# Patient Record
Sex: MALE | Race: OTHER | NOT HISPANIC OR LATINO
[De-identification: names, ages, dates, MRNs, and addresses within clinical notes are randomized per-mention and may not be internally consistent; named-entity substitution may affect disease eponyms.]

---

## 2018-02-05 PROBLEM — Z00.00 ENCOUNTER FOR PREVENTIVE HEALTH EXAMINATION: Status: ACTIVE | Noted: 2018-02-05

## 2018-02-09 ENCOUNTER — APPOINTMENT (OUTPATIENT)
Dept: OPHTHALMOLOGY | Facility: CLINIC | Age: 58
End: 2018-02-09
Payer: COMMERCIAL

## 2018-02-09 PROCEDURE — 92014 COMPRE OPH EXAM EST PT 1/>: CPT

## 2018-02-09 PROCEDURE — 92015 DETERMINE REFRACTIVE STATE: CPT

## 2022-05-25 ENCOUNTER — APPOINTMENT (OUTPATIENT)
Dept: UROLOGY | Facility: CLINIC | Age: 62
End: 2022-05-25
Payer: COMMERCIAL

## 2022-05-25 VITALS
HEIGHT: 69 IN | WEIGHT: 170 LBS | OXYGEN SATURATION: 98 % | BODY MASS INDEX: 25.18 KG/M2 | SYSTOLIC BLOOD PRESSURE: 122 MMHG | TEMPERATURE: 98.1 F | HEART RATE: 57 BPM | DIASTOLIC BLOOD PRESSURE: 78 MMHG

## 2022-05-25 DIAGNOSIS — Z80.0 FAMILY HISTORY OF MALIGNANT NEOPLASM OF DIGESTIVE ORGANS: ICD-10-CM

## 2022-05-25 DIAGNOSIS — N50.3 CYST OF EPIDIDYMIS: ICD-10-CM

## 2022-05-25 DIAGNOSIS — E78.00 PURE HYPERCHOLESTEROLEMIA, UNSPECIFIED: ICD-10-CM

## 2022-05-25 DIAGNOSIS — Z78.9 OTHER SPECIFIED HEALTH STATUS: ICD-10-CM

## 2022-05-25 DIAGNOSIS — Z80.42 FAMILY HISTORY OF MALIGNANT NEOPLASM OF PROSTATE: ICD-10-CM

## 2022-05-25 PROCEDURE — 99205 OFFICE O/P NEW HI 60 MIN: CPT

## 2022-05-25 RX ORDER — OMEPRAZOLE MAGNESIUM 20 MG/1
20 CAPSULE, DELAYED RELEASE ORAL
Refills: 0 | Status: ACTIVE | COMMUNITY

## 2022-05-25 RX ORDER — FAMOTIDINE 10 MG/1
TABLET, FILM COATED ORAL
Refills: 0 | Status: ACTIVE | COMMUNITY

## 2022-05-25 RX ORDER — BUDESONIDE AND FORMOTEROL FUMARATE DIHYDRATE 160; 4.5 UG/1; UG/1
AEROSOL RESPIRATORY (INHALATION)
Refills: 0 | Status: ACTIVE | COMMUNITY

## 2022-05-25 RX ORDER — ATORVASTATIN CALCIUM 80 MG/1
TABLET, FILM COATED ORAL
Refills: 0 | Status: ACTIVE | COMMUNITY

## 2022-05-25 NOTE — LETTER BODY
[Dear  ___] : Dear  [unfilled], [Consult Letter:] : I had the pleasure of evaluating your patient, [unfilled]. [Please see my note below.] : Please see my note below. [Consult Closing:] : Thank you very much for allowing me to participate in the care of this patient.  If you have any questions, please do not hesitate to contact me. [Sincerely,] : Sincerely, [FreeTextEntry3] : Lazaro Myers MD\par

## 2022-05-25 NOTE — PHYSICAL EXAM
[General Appearance - Well Developed] : well developed [General Appearance - Well Nourished] : well nourished [Normal Appearance] : normal appearance [Well Groomed] : well groomed [General Appearance - In No Acute Distress] : no acute distress [Edema] : no peripheral edema [Respiration, Rhythm And Depth] : normal respiratory rhythm and effort [Exaggerated Use Of Accessory Muscles For Inspiration] : no accessory muscle use [Abdomen Soft] : soft [Abdomen Tenderness] : non-tender [Costovertebral Angle Tenderness] : no ~M costovertebral angle tenderness [Urethral Meatus] : meatus normal [Urinary Bladder Findings] : the bladder was normal on palpation [Scrotum] : the scrotum was normal [Testes Mass (___cm)] : there were no testicular masses [No Prostate Nodules] : no prostate nodules [Normal Station and Gait] : the gait and station were normal for the patient's age [] : no rash [No Focal Deficits] : no focal deficits [Oriented To Time, Place, And Person] : oriented to person, place, and time [Affect] : the affect was normal [Mood] : the mood was normal [Not Anxious] : not anxious [No Palpable Adenopathy] : no palpable adenopathy [Epididymis] : the epididymides were normal [Testes Tenderness] : no tenderness of the testes [FreeTextEntry1] : TARA: deferred. small bilateral epididymal cysts. NO intervention needed

## 2022-05-25 NOTE — REVIEW OF SYSTEMS
[Cough] : cough [Wheezing] : wheezing [Heartburn] : heartburn [Poor quality erections] : Poor quality erections [Seen by urologist before (Name)  ___] : Preciously seen by a urologist: [unfilled] [Negative] : Heme/Lymph

## 2022-05-25 NOTE — HISTORY OF PRESENT ILLNESS
[FreeTextEntry1] : 05/25/2022: Consultation for Ca prostate recently diagnosed. \par \par PSA  from 2 yrs ago :  4.6 ng/ ml \par 01/22/2022: 7.69 ng/ ml \par \par MRI:   2/18/22 : PIRADS 4 lesion right lobe of prostate. \par \par Fusion biopsy : 5/5/22 [facility], Ca prostate. Report attached.  \par \par Pt has an appointment with Oncologist. \par \par TARA deferred for recent biopsy. \par \par All different options explained and discussed. \par \par pt. will repeat PSA in one month and reevaluate in 6 weeks with TARA and repeat PSA.\par \par

## 2022-07-07 ENCOUNTER — APPOINTMENT (OUTPATIENT)
Dept: UROLOGY | Facility: CLINIC | Age: 62
End: 2022-07-07

## 2022-07-07 VITALS
HEIGHT: 69 IN | WEIGHT: 172 LBS | RESPIRATION RATE: 16 BRPM | TEMPERATURE: 97.8 F | SYSTOLIC BLOOD PRESSURE: 127 MMHG | HEART RATE: 72 BPM | DIASTOLIC BLOOD PRESSURE: 81 MMHG | BODY MASS INDEX: 25.48 KG/M2

## 2022-07-07 DIAGNOSIS — C61 MALIGNANT NEOPLASM OF PROSTATE: ICD-10-CM

## 2022-07-07 PROCEDURE — 99215 OFFICE O/P EST HI 40 MIN: CPT

## 2022-07-07 NOTE — END OF VISIT
Called and left a message for the patient to call us back to schedule an appointment for sclerotherapy with Dr. Mercedes in [place].     Received notification that it has been approved from [facility].     Per Dr. Mercedes can be scheduled on 2/7/19 or after in [place]. Appointment should be 30 minutes in length.    [Time Spent: ___ minutes] : I have spent [unfilled] minutes of time on the encounter.

## 2022-07-07 NOTE — HISTORY OF PRESENT ILLNESS
[FreeTextEntry1] : Here for visit to discuss prostate cancer diagnosis.\par Elevated PSA, abnormal prostate MRI.\par Fusion biopsy showed David 7 (3+4) prostate cancer.\par No urinary complaints. \par Has pre-existing erectile dysfunction but not bothersome at the current time.\par +Family hx of prostate cancer: Brother, Father.\par

## 2022-07-07 NOTE — DISEASE MANAGEMENT
[1] : T1 [c] : c [0] : M0 [0-10] : 0 -10 ng/mL [Biopsy with Fusion] : Patient had a biopsy with fusion on [7(3+4)] : Fusion Biopsy [place] Score: 7(3+4) [Biopsy results sent to PCP/Referring Physician] : Biopsy results sent to PCP/Referring Physician [IIB] : IIB [] : Patient had no Bone Scan performed [BiopsyDate] : 5/9/2022 [TotalCores] : 13 [TotalPositiveCores] : 4 [MaxCoreInvolvement] : 40% [FreeTextEntry7] : MRI prostate ([facility]): 33 mL volume, PIRAD 4, Right PZpl. No OMAIRA, SV.

## 2022-07-07 NOTE — DISEASE MANAGEMENT
[1] : T1 [c] : c [0] : M0 [0-10] : 0 -10 ng/mL [Biopsy with Fusion] : Patient had a biopsy with fusion on [7(3+4)] : Fusion Biopsy [place] Score: 7(3+4) [Biopsy results sent to PCP/Referring Physician] : Biopsy results sent to PCP/Referring Physician [IIB] : IIB [] : Patient had no Bone Scan performed [BiopsyDate] : 5/9/2022 [TotalCores] : 13 [TotalPositiveCores] : 4 [MaxCoreInvolvement] : 40% [FreeTextEntry7] : MRI prostate ([facility]): 33 mL volume, PIRAD 4, Right PZpl. No OMAIRA, SV.

## 2022-07-07 NOTE — LETTER CLOSING
[Consult Closing:] : Thank you for allowing me to participate in the care of this patient.  If you have any questions, please do not hesitate to contact me. [Sincerely yours,] : Sincerely yours, [FreeTextEntry3] : Valdo Velazquez MD\par System Chief of Urology, [facility]\par  of Urology\par [employer] [facility]\par [facility] \par [address] [place] [address], [address] \[place] [address]

## 2022-07-07 NOTE — LETTER CLOSING
[Consult Closing:] : Thank you for allowing me to participate in the care of this patient.  If you have any questions, please do not hesitate to contact me. [Sincerely yours,] : Sincerely yours, [FreeTextEntry3] : Valdo Velazquez MD\par System Chief of Urology, [facility]\par  of Urology\par [facility]\par [facility] \par [address] [place] [address], [address] \[place] [address]

## 2022-07-15 ENCOUNTER — RESULT REVIEW (OUTPATIENT)
Age: 62
End: 2022-07-15

## 2022-08-02 ENCOUNTER — APPOINTMENT (OUTPATIENT)
Dept: UROLOGY | Facility: CLINIC | Age: 62
End: 2022-08-02

## 2022-08-03 ENCOUNTER — OUTPATIENT (OUTPATIENT)
Dept: OUTPATIENT SERVICES | Facility: HOSPITAL | Age: 62
LOS: 1 days | End: 2022-08-03

## 2022-08-03 VITALS
HEIGHT: 67.5 IN | HEART RATE: 58 BPM | WEIGHT: 175.05 LBS | RESPIRATION RATE: 16 BRPM | TEMPERATURE: 98 F | OXYGEN SATURATION: 97 % | DIASTOLIC BLOOD PRESSURE: 80 MMHG | SYSTOLIC BLOOD PRESSURE: 110 MMHG

## 2022-08-03 DIAGNOSIS — C61 MALIGNANT NEOPLASM OF PROSTATE: ICD-10-CM

## 2022-08-03 DIAGNOSIS — Z98.890 OTHER SPECIFIED POSTPROCEDURAL STATES: Chronic | ICD-10-CM

## 2022-08-03 DIAGNOSIS — J45.909 UNSPECIFIED ASTHMA, UNCOMPLICATED: ICD-10-CM

## 2022-08-03 LAB
BLD GP AB SCN SERPL QL: NEGATIVE — SIGNIFICANT CHANGE UP
RH IG SCN BLD-IMP: POSITIVE — SIGNIFICANT CHANGE UP

## 2022-08-03 NOTE — H&P PST ADULT - CARDIOVASCULAR
regular rate and rhythm/S1 S2 present/no gallops/no rub details… regular rate and rhythm/S1 S2 present/no gallops/no rub/no murmur/no pedal edema

## 2022-08-03 NOTE — H&P PST ADULT - LYMPHATIC
No lymphadedenopathy posterior cervical L/posterior cervical R/anterior cervical L/anterior cervical R/supraclavicular L/supraclavicular R/No lymphadedenopathy

## 2022-08-03 NOTE — H&P PST ADULT - HISTORY OF PRESENT ILLNESS
63 y/o M with h/o: Asthma, GERD   elevated PSA 2 years ago. S/P MRI on 01/2022. S/P biopsy of prostate 63 y/o M with h/o: Asthma, GERD , HLD presents to PST for pre op evaluation with h/o elevated PSA 2 years ago. S/P MRI on 01/2022. S/P biopsy of prostate. Pre op diagnosis: malignant neoplasm of prostate. Schedule for single port robotic assisted laparoscopic radical prostatectomy, possible pelvic lymph node dissection

## 2022-08-03 NOTE — H&P PST ADULT - NSICDXPASTMEDICALHX_GEN_ALL_CORE_FT
PAST MEDICAL HISTORY:  Asthma     GERD (gastroesophageal reflux disease)     HLD (hyperlipidemia)

## 2022-08-03 NOTE — H&P PST ADULT - NSICDXFAMILYHX_GEN_ALL_CORE_FT
FAMILY HISTORY:  Father  Still living? Unknown  FHx: stomach cancer, Age at diagnosis: Age Unknown    Sibling  Still living? No  Family history of breast cancer in sister, Age at diagnosis: Age Unknown  FHx: stomach cancer, Age at diagnosis: Age Unknown

## 2022-08-03 NOTE — H&P PST ADULT - MUSCULOSKELETAL
details… ROM intact/no joint swelling/no joint erythema/no joint warmth/normal gait/strength 5/5 bilateral upper extremities

## 2022-08-03 NOTE — H&P PST ADULT - NSICDXPASTSURGICALHX_GEN_ALL_CORE_FT
PAST SURGICAL HISTORY:  H/O right inguinal hernia repair     History of endoscopy      PAST SURGICAL HISTORY:  H/O arthroscopy of left knee > 20 years ago    H/O right inguinal hernia repair     History of endoscopy

## 2022-08-03 NOTE — H&P PST ADULT - PROBLEM SELECTOR PLAN 1
Schedule for single port robotic assisted laparoscopic radical prostatectomy, possible pelvic lymph node dissection tentatively on 08/19/22. Pre op instructions, famotidine, chlorhexidine gluconate soap given and explained. Pt verbalized understanding.  Covid test ordered

## 2022-08-03 NOTE — H&P PST ADULT - RESPIRATORY
clear to auscultation bilaterally/no wheezes/no rales/no rhonchi/no respiratory distress/no subcutaneous emphysema clear to auscultation bilaterally/no wheezes/no rales/no rhonchi/no respiratory distress/no subcutaneous emphysema/breath sounds equal/good air movement/respirations non-labored/no intercostal retractions

## 2022-08-04 LAB
CULTURE RESULTS: SIGNIFICANT CHANGE UP
SPECIMEN SOURCE: SIGNIFICANT CHANGE UP

## 2022-08-15 ENCOUNTER — NON-APPOINTMENT (OUTPATIENT)
Age: 62
End: 2022-08-15

## 2022-08-18 ENCOUNTER — TRANSCRIPTION ENCOUNTER (OUTPATIENT)
Age: 62
End: 2022-08-18

## 2022-08-18 NOTE — ASU PATIENT PROFILE, ADULT - NSICDXPASTSURGICALHX_GEN_ALL_CORE_FT
PAST SURGICAL HISTORY:  H/O arthroscopy of left knee > 20 years ago    H/O right inguinal hernia repair     History of endoscopy

## 2022-08-19 ENCOUNTER — INPATIENT (INPATIENT)
Facility: HOSPITAL | Age: 62
LOS: 0 days | Discharge: HOME | End: 2022-08-20
Attending: UROLOGY | Admitting: UROLOGY

## 2022-08-19 ENCOUNTER — RESULT REVIEW (OUTPATIENT)
Age: 62
End: 2022-08-19

## 2022-08-19 ENCOUNTER — APPOINTMENT (OUTPATIENT)
Dept: UROLOGY | Facility: HOSPITAL | Age: 62
End: 2022-08-19

## 2022-08-19 VITALS
HEIGHT: 67.5 IN | SYSTOLIC BLOOD PRESSURE: 108 MMHG | WEIGHT: 175.05 LBS | HEART RATE: 78 BPM | RESPIRATION RATE: 18 BRPM | DIASTOLIC BLOOD PRESSURE: 71 MMHG | TEMPERATURE: 98 F | OXYGEN SATURATION: 95 %

## 2022-08-19 DIAGNOSIS — E78.5 HYPERLIPIDEMIA, UNSPECIFIED: ICD-10-CM

## 2022-08-19 DIAGNOSIS — Z98.890 OTHER SPECIFIED POSTPROCEDURAL STATES: Chronic | ICD-10-CM

## 2022-08-19 DIAGNOSIS — K21.9 GASTRO-ESOPHAGEAL REFLUX DISEASE WITHOUT ESOPHAGITIS: ICD-10-CM

## 2022-08-19 DIAGNOSIS — C61 MALIGNANT NEOPLASM OF PROSTATE: ICD-10-CM

## 2022-08-19 LAB — RH IG SCN BLD-IMP: POSITIVE — SIGNIFICANT CHANGE UP

## 2022-08-19 PROCEDURE — 99223 1ST HOSP IP/OBS HIGH 75: CPT

## 2022-08-19 PROCEDURE — 38571 LAPAROSCOPY LYMPHADENECTOMY: CPT

## 2022-08-19 PROCEDURE — 88305 TISSUE EXAM BY PATHOLOGIST: CPT | Mod: 26

## 2022-08-19 PROCEDURE — 88307 TISSUE EXAM BY PATHOLOGIST: CPT | Mod: 26

## 2022-08-19 PROCEDURE — 55866 LAPS SURG PRST8ECT RPBIC RAD: CPT

## 2022-08-19 DEVICE — LIGATING CLIPS WECK HEMOLOK POLYMER MEDIUM-LARGE (GREEN) 6: Type: IMPLANTABLE DEVICE | Status: FUNCTIONAL

## 2022-08-19 DEVICE — LIGATING CLIPS WECK HEMOLOK POLYMER LARGE (PURPLE) 6: Type: IMPLANTABLE DEVICE | Status: FUNCTIONAL

## 2022-08-19 DEVICE — SURGICEL 4 X 8": Type: IMPLANTABLE DEVICE | Status: FUNCTIONAL

## 2022-08-19 RX ORDER — TOBRAMYCIN 0.3 %
1 DROPS OPHTHALMIC (EYE) EVERY 4 HOURS
Refills: 0 | Status: COMPLETED | OUTPATIENT
Start: 2022-08-19 | End: 2022-08-20

## 2022-08-19 RX ORDER — SODIUM CHLORIDE 9 MG/ML
1000 INJECTION, SOLUTION INTRAVENOUS
Refills: 0 | Status: DISCONTINUED | OUTPATIENT
Start: 2022-08-19 | End: 2022-08-20

## 2022-08-19 RX ORDER — BUDESONIDE AND FORMOTEROL FUMARATE DIHYDRATE 160; 4.5 UG/1; UG/1
2 AEROSOL RESPIRATORY (INHALATION)
Qty: 0 | Refills: 0 | DISCHARGE

## 2022-08-19 RX ORDER — KETOROLAC TROMETHAMINE 30 MG/ML
15 SYRINGE (ML) INJECTION EVERY 6 HOURS
Refills: 0 | Status: DISCONTINUED | OUTPATIENT
Start: 2022-08-19 | End: 2022-08-19

## 2022-08-19 RX ORDER — ACETAMINOPHEN 500 MG
975 TABLET ORAL EVERY 6 HOURS
Refills: 0 | Status: DISCONTINUED | OUTPATIENT
Start: 2022-08-19 | End: 2022-08-20

## 2022-08-19 RX ORDER — OMEPRAZOLE 10 MG/1
1 CAPSULE, DELAYED RELEASE ORAL
Qty: 0 | Refills: 0 | DISCHARGE

## 2022-08-19 RX ORDER — KETOROLAC TROMETHAMINE 30 MG/ML
15 SYRINGE (ML) INJECTION EVERY 6 HOURS
Refills: 0 | Status: COMPLETED | OUTPATIENT
Start: 2022-08-19 | End: 2022-08-21

## 2022-08-19 RX ORDER — FAMOTIDINE 10 MG/ML
2 INJECTION INTRAVENOUS
Qty: 0 | Refills: 0 | DISCHARGE

## 2022-08-19 RX ORDER — OXYCODONE HYDROCHLORIDE 5 MG/1
5 TABLET ORAL ONCE
Refills: 0 | Status: DISCONTINUED | OUTPATIENT
Start: 2022-08-19 | End: 2022-08-19

## 2022-08-19 RX ORDER — CALCIUM CHLORIDE, MAGNESIUM CHLORIDE, POTASSIUM CHLORIDE, SODIUM ACETATE, SODIUM CHLORIDE, SODIUM CITRATE .48; .3; .75; 3.9; 6.4; 1.7 MG/ML; MG/ML; MG/ML; MG/ML; MG/ML; MG/ML
1 SOLUTION OPHTHALMIC ONCE
Refills: 0 | Status: COMPLETED | OUTPATIENT
Start: 2022-08-19 | End: 2022-08-19

## 2022-08-19 RX ORDER — SENNA PLUS 8.6 MG/1
2 TABLET ORAL AT BEDTIME
Refills: 0 | Status: DISCONTINUED | OUTPATIENT
Start: 2022-08-19 | End: 2022-08-20

## 2022-08-19 RX ORDER — ATORVASTATIN CALCIUM 80 MG/1
1 TABLET, FILM COATED ORAL
Qty: 0 | Refills: 0 | DISCHARGE

## 2022-08-19 RX ORDER — HYDROMORPHONE HYDROCHLORIDE 2 MG/ML
0.5 INJECTION INTRAMUSCULAR; INTRAVENOUS; SUBCUTANEOUS
Refills: 0 | Status: DISCONTINUED | OUTPATIENT
Start: 2022-08-19 | End: 2022-08-19

## 2022-08-19 RX ORDER — BUDESONIDE AND FORMOTEROL FUMARATE DIHYDRATE 160; 4.5 UG/1; UG/1
2 AEROSOL RESPIRATORY (INHALATION)
Refills: 0 | Status: DISCONTINUED | OUTPATIENT
Start: 2022-08-19 | End: 2022-08-20

## 2022-08-19 RX ORDER — ATORVASTATIN CALCIUM 80 MG/1
40 TABLET, FILM COATED ORAL AT BEDTIME
Refills: 0 | Status: DISCONTINUED | OUTPATIENT
Start: 2022-08-19 | End: 2022-08-20

## 2022-08-19 RX ORDER — HEPARIN SODIUM 5000 [USP'U]/ML
5000 INJECTION INTRAVENOUS; SUBCUTANEOUS EVERY 8 HOURS
Refills: 0 | Status: DISCONTINUED | OUTPATIENT
Start: 2022-08-19 | End: 2022-08-20

## 2022-08-19 RX ORDER — FAMOTIDINE 10 MG/ML
20 INJECTION INTRAVENOUS DAILY
Refills: 0 | Status: DISCONTINUED | OUTPATIENT
Start: 2022-08-19 | End: 2022-08-20

## 2022-08-19 RX ORDER — OXYCODONE HYDROCHLORIDE 5 MG/1
5 TABLET ORAL EVERY 6 HOURS
Refills: 0 | Status: DISCONTINUED | OUTPATIENT
Start: 2022-08-19 | End: 2022-08-20

## 2022-08-19 RX ORDER — SODIUM CHLORIDE 9 MG/ML
1000 INJECTION, SOLUTION INTRAVENOUS
Refills: 0 | Status: DISCONTINUED | OUTPATIENT
Start: 2022-08-19 | End: 2022-08-19

## 2022-08-19 RX ORDER — PANTOPRAZOLE SODIUM 20 MG/1
40 TABLET, DELAYED RELEASE ORAL
Refills: 0 | Status: DISCONTINUED | OUTPATIENT
Start: 2022-08-19 | End: 2022-08-20

## 2022-08-19 RX ORDER — POLYETHYLENE GLYCOL 3350 17 G/17G
17 POWDER, FOR SOLUTION ORAL DAILY
Refills: 0 | Status: DISCONTINUED | OUTPATIENT
Start: 2022-08-19 | End: 2022-08-20

## 2022-08-19 RX ADMIN — Medication 1 DROP(S): at 21:00

## 2022-08-19 RX ADMIN — HEPARIN SODIUM 5000 UNIT(S): 5000 INJECTION INTRAVENOUS; SUBCUTANEOUS at 23:02

## 2022-08-19 RX ADMIN — HEPARIN SODIUM 5000 UNIT(S): 5000 INJECTION INTRAVENOUS; SUBCUTANEOUS at 14:08

## 2022-08-19 RX ADMIN — Medication 15 MILLIGRAM(S): at 18:04

## 2022-08-19 RX ADMIN — Medication 1 DROP(S): at 16:52

## 2022-08-19 RX ADMIN — SENNA PLUS 2 TABLET(S): 8.6 TABLET ORAL at 23:03

## 2022-08-19 RX ADMIN — Medication 15 MILLIGRAM(S): at 23:07

## 2022-08-19 RX ADMIN — Medication 975 MILLIGRAM(S): at 23:06

## 2022-08-19 RX ADMIN — CALCIUM CHLORIDE, MAGNESIUM CHLORIDE, POTASSIUM CHLORIDE, SODIUM ACETATE, SODIUM CHLORIDE, SODIUM CITRATE 1 APPLICATION(S): .48; .3; .75; 3.9; 6.4; 1.7 SOLUTION OPHTHALMIC at 14:26

## 2022-08-19 RX ADMIN — ATORVASTATIN CALCIUM 40 MILLIGRAM(S): 80 TABLET, FILM COATED ORAL at 23:03

## 2022-08-19 RX ADMIN — Medication 975 MILLIGRAM(S): at 18:03

## 2022-08-19 NOTE — PATIENT PROFILE ADULT - OVER THE PAST TWO WEEKS, HAVE YOU FELT LITTLE INTEREST OR PLEASURE IN DOING THINGS?
Physician Pre-Sedation Assessment    Pre-Sedation Assessment:    Sedation History: Previous Sedation with No Complications and Airway Assessed    Cardiac: normal S1, S2  Respiratory: breath sounds clear bilaterally   Abdomen: soft, BS (+), non-tender    AS
no

## 2022-08-19 NOTE — CONSULT NOTE ADULT - PROBLEM SELECTOR RECOMMENDATION 3
c/w symbicort, lungs clear  can give albuterol inhaler prn sob/wheezing  incentive spirometry, early mobilization

## 2022-08-19 NOTE — PATIENT PROFILE ADULT - FALL HARM RISK - HARM RISK INTERVENTIONS

## 2022-08-19 NOTE — CHART NOTE - NSCHARTNOTEFT_GEN_A_CORE
Post op Check: 61 yo POD #0 RALP/LND    Pt seen and examined without complaints. Pain is controlled. Denies SOB/CP/N/V.     Vital Signs Last 24 Hrs  T(C): 36.7 (19 Aug 2022 11:55), Max: 36.7 (19 Aug 2022 11:55)  T(F): 98.1 (19 Aug 2022 11:55), Max: 98.1 (19 Aug 2022 11:55)  HR: 76 (19 Aug 2022 13:00) (76 - 90)  BP: 117/70 (19 Aug 2022 13:00) (101/65 - 118/73)  BP(mean): 81 (19 Aug 2022 13:00) (74 - 83)  RR: 14 (19 Aug 2022 13:00) (13 - 18)  SpO2: 99% (19 Aug 2022 13:00) (95% - 100%)    Parameters below as of 19 Aug 2022 12:45  Patient On (Oxygen Delivery Method): nasal cannula  O2 Flow (L/min): 2      I&O's Summary  Anthony: 100+ light punch  19 Aug 2022 07:01  -  19 Aug 2022 13:40  --------------------------------------------------------  IN: 250 mL / OUT: 100 mL / NET: 150 mL        Physical Exam  Gen: NAD  Pulm: No respiratory distress, clear to auscultation  CV: Reg  Abd: Steris c/d/i, soft, NT, ND  : Cruz secured   Venodynes: In place          Plan:   IVF: LR @125  Diet: CLD  Labs: None  Abx: None  Strict I&Os  F/u medicine  Analgesia and antiemetics as needed  DVT prophylaxis/OOB/Incentive spirometry

## 2022-08-19 NOTE — CONSULT NOTE ADULT - SUBJECTIVE AND OBJECTIVE BOX
Shannon Bradley MD  Pager [phone]    HPI:  63 y/o Male with h/o asthma, GERD , HLD, elevated PSA s/p biopsy demonstrating prostate cancer, admitted for single port robotic assisted laparoscopic radical prostatectomy, pelvic lymph node dissection on 8/19/22.   Patient seen postop in PACU, hemodynamically stable and satting well, still a little groggy and tired. Pain controlled, denies chest pain/sob/dizziness. Denies dysuria/hematuria/voiding problems preop.       PAST MEDICAL & SURGICAL HISTORY:  Asthma      HLD (hyperlipidemia)      GERD (gastroesophageal reflux disease)      H/O right inguinal hernia repair      History of endoscopy      H/O arthroscopy of left knee  &gt; 20 years ago          Review of Systems:   CONSTITUTIONAL: No fever, weight loss, or fatigue  EYES: No eye pain, visual disturbances, or discharge  ENMT:  No difficulty hearing, tinnitus, vertigo; No sinus or throat pain  NECK: No pain or stiffness  BREASTS: No pain, masses, or nipple discharge  RESPIRATORY: No cough, wheezing, chills or hemoptysis; No shortness of breath  CARDIOVASCULAR: No chest pain, palpitations, dizziness, or leg swelling  GASTROINTESTINAL: No abdominal or epigastric pain. No nausea, vomiting, or hematemesis; No diarrhea or constipation. No melena or hematochezia.  GENITOURINARY: No dysuria, frequency, hematuria, or incontinence  NEUROLOGICAL: No headaches, memory loss, loss of strength, numbness, or tremors  SKIN: No itching, burning, rashes, or lesions   LYMPH NODES: No enlarged glands  ENDOCRINE: No heat or cold intolerance; No hair loss  MUSCULOSKELETAL: No joint pain or swelling; No muscle, back, or extremity pain  PSYCHIATRIC: No depression, anxiety, mood swings, or difficulty sleeping  HEME/LYMPH: No easy bruising, or bleeding gums  ALLERY AND IMMUNOLOGIC: No hives or eczema    Allergies    No Known Allergies    Intolerances        Social History: former smoker 1 ppd x20 years, quit 20 years ago, denies alcohol use     FAMILY HISTORY:  Family history of breast cancer in sister (Sibling)    FHx: stomach cancer (Sibling, Father)        Home Medications:  atorvastatin 40 mg oral tablet: 1 tab(s) orally once a day (19 Aug 2022 06:25)  Pepcid 20 mg oral tablet: 2 tab(s) orally once a day (at bedtime) (19 Aug 2022 06:25)  PriLOSEC 20 mg oral delayed release capsule: 1 cap(s) orally once a day (19 Aug 2022 06:25)  Symbicort 160 mcg-4.5 mcg/inh inhalation aerosol: 2 puff(s) inhaled 2 times a day (19 Aug 2022 06:25)      MEDICATIONS  (STANDING):  acetaminophen     Tablet .. 975 milliGRAM(s) Oral every 6 hours  atorvastatin 40 milliGRAM(s) Oral at bedtime  budesonide 160 MICROgram(s)/formoterol 4.5 MICROgram(s) Inhaler 2 Puff(s) Inhalation two times a day  famotidine    Tablet 20 milliGRAM(s) Oral daily  heparin   Injectable 5000 Unit(s) SubCutaneous every 8 hours  ketorolac   Injectable 15 milliGRAM(s) IV Push every 6 hours  lactated ringers. 1000 milliLiter(s) (125 mL/Hr) IV Continuous <Continuous>  pantoprazole    Tablet 40 milliGRAM(s) Oral before breakfast  senna 2 Tablet(s) Oral at bedtime    MEDICATIONS  (PRN):  HYDROmorphone  Injectable 0.5 milliGRAM(s) IV Push every 10 minutes PRN Moderate Pain (4 - 6)  oxyCODONE    IR 5 milliGRAM(s) Oral once PRN Moderate Pain (4 - 6)  oxyCODONE    IR 5 milliGRAM(s) Oral every 6 hours PRN Severe Pain (7 - 10)  polyethylene glycol 3350 17 Gram(s) Oral daily PRN Constipation      Vital Signs Last 24 Hrs  T(C): 36.7 (19 Aug 2022 11:55), Max: 36.7 (19 Aug 2022 11:55)  T(F): 98.1 (19 Aug 2022 11:55), Max: 98.1 (19 Aug 2022 11:55)  HR: 79 (19 Aug 2022 14:00) (76 - 90)  BP: 123/78 (19 Aug 2022 14:00) (101/65 - 123/78)  BP(mean): 87 (19 Aug 2022 14:00) (74 - 93)  RR: 13 (19 Aug 2022 14:00) (13 - 18)  SpO2: 99% (19 Aug 2022 14:00) (95% - 100%)    Parameters below as of 19 Aug 2022 12:45  Patient On (Oxygen Delivery Method): nasal cannula  O2 Flow (L/min): 2    CAPILLARY BLOOD GLUCOSE        I&O's Summary    19 Aug 2022 07:01  -  19 Aug 2022 14:12  --------------------------------------------------------  IN: 375 mL / OUT: 150 mL / NET: 225 mL        PHYSICAL EXAM:  GENERAL: NAD, well-developed  HEAD:  Atraumatic, Normocephalic  EYES: EOMI, PERRLA, conjunctiva and sclera clear  NECK: Supple, No JVD  CHEST/LUNG: Clear to auscultation bilaterally; No wheeze  HEART: Regular rate and rhythm; No murmurs, rubs, or gallops  ABDOMEN: Soft, incisional tenderness, wounds clean  : dubois in place  EXTREMITIES:  2+ Peripheral Pulses, No clubbing, cyanosis, or edema  PSYCH: AAOx3  NEUROLOGY: non-focal  SKIN: No rashes or lesions    LABS:        Microbiology     RADIOLOGY & ADDITIONAL TESTS:    Imaging Personally Reviewed:    Consultant(s) Notes Reviewed:      Care Discussed with Consultants/Other Providers:

## 2022-08-19 NOTE — CONSULT NOTE ADULT - ASSESSMENT
63 y/o Male with h/o asthma, GERD , HLD, elevated PSA s/p biopsy demonstrating prostate cancer, s/p single port robotic assisted laparoscopic radical prostatectomy, pelvic lymph node dissection on 8/19/22.

## 2022-08-19 NOTE — CONSULT NOTE ADULT - PROBLEM SELECTOR RECOMMENDATION 9
single port robotic assisted laparoscopic radical prostatectomy, pelvic lymph node dissection on 8/19/22.  postop management per , pain control, IVF, incentive spirometry, DVT ppx

## 2022-08-20 ENCOUNTER — TRANSCRIPTION ENCOUNTER (OUTPATIENT)
Age: 62
End: 2022-08-20

## 2022-08-20 VITALS
HEART RATE: 85 BPM | TEMPERATURE: 98 F | SYSTOLIC BLOOD PRESSURE: 105 MMHG | DIASTOLIC BLOOD PRESSURE: 66 MMHG | OXYGEN SATURATION: 100 % | RESPIRATION RATE: 17 BRPM

## 2022-08-20 PROCEDURE — 99232 SBSQ HOSP IP/OBS MODERATE 35: CPT

## 2022-08-20 RX ORDER — OXYCODONE HYDROCHLORIDE 5 MG/1
1 TABLET ORAL
Qty: 5 | Refills: 0
Start: 2022-08-20 | End: 2022-08-20

## 2022-08-20 RX ORDER — ACETAMINOPHEN 500 MG
3 TABLET ORAL
Qty: 0 | Refills: 0 | DISCHARGE
Start: 2022-08-20

## 2022-08-20 RX ADMIN — Medication 975 MILLIGRAM(S): at 07:17

## 2022-08-20 RX ADMIN — Medication 15 MILLIGRAM(S): at 12:19

## 2022-08-20 RX ADMIN — PANTOPRAZOLE SODIUM 40 MILLIGRAM(S): 20 TABLET, DELAYED RELEASE ORAL at 07:18

## 2022-08-20 RX ADMIN — Medication 1 DROP(S): at 00:53

## 2022-08-20 RX ADMIN — BUDESONIDE AND FORMOTEROL FUMARATE DIHYDRATE 2 PUFF(S): 160; 4.5 AEROSOL RESPIRATORY (INHALATION) at 10:42

## 2022-08-20 RX ADMIN — Medication 975 MILLIGRAM(S): at 12:19

## 2022-08-20 RX ADMIN — HEPARIN SODIUM 5000 UNIT(S): 5000 INJECTION INTRAVENOUS; SUBCUTANEOUS at 06:18

## 2022-08-20 RX ADMIN — FAMOTIDINE 20 MILLIGRAM(S): 10 INJECTION INTRAVENOUS at 12:19

## 2022-08-20 RX ADMIN — Medication 15 MILLIGRAM(S): at 06:18

## 2022-08-20 RX ADMIN — SODIUM CHLORIDE 125 MILLILITER(S): 9 INJECTION, SOLUTION INTRAVENOUS at 10:48

## 2022-08-20 NOTE — DISCHARGE NOTE PROVIDER - HOSPITAL COURSE
62M underwent SP RALP/LND on 8/19.  Postoperative course uneventful, pain controlled, urine remained acceptable in color, ambulating.  Return of GI function on POD #1, diet advanced without incident. By POD1, he met all postoperative goals. He was discharged home with Cruz catheter attached to a leg bag after receiving appropriate teaching.

## 2022-08-20 NOTE — DISCHARGE NOTE NURSING/CASE MANAGEMENT/SOCIAL WORK - NSDCPEFALRISK_GEN_ALL_CORE
For information on Fall & Injury Prevention, visit: [email or web address] OR  [email or web address] OR  https://www.cdc.gov/steadi/patient.html

## 2022-08-20 NOTE — DISCHARGE NOTE PROVIDER - NSDCFUADDINST_GEN_ALL_CORE_FT
You will be sent home with a dubois catheter, to be removed at a later date by your urologist.  You will receive a separate set of instructions regarding catheter management.      -After your surgery, it is common to have some blood in the urine.  The urine may appear pink or even red.  If you start to notice thick blood or many blood clots in the urine, call your physician.  Otherwise, drink a lot of fluids in order to dilute the urine well.  If you are unable to urinate or you feel abdominal fullness, sitting in a tub of warm water (sitz bath) may help; if not, call your physician.    -Call the office if you have fever greater than 101, no urine in bag, pain not relieved with pain medication, nausea/vomiting.    -You may experience weakness after you go home; this is normal.  You will slowly regain your strength, during which time you may gradually increase your level of activity.  Do not exercise, lift heavy objects, drive, or resume sexual activity until you are told to do so by your physician.  You may walk around and even climb stairs, carefully.  Do not allow yourself to become constipated, as straining may cause bleeding.  Take stool softeners (ex. Colace) or a laxative (ex. Senekot, ExLax) if needed.  Pain medications may worsen constipation.  If you are using these medications, try to use only that which you need for pain control.  If Extra Strength Tylenol is adequate to control your pain, this is an excellent choice, as this does not cause constipation.    -Your incision may be covered with steristrips. You may shower, just pat white strips dry, they will fall off in a few weeks. Change dressing at drain site daily or as needed until dry.  If your incisions “oozes” a small amount of clear liquid, do not be alarmed.  If this drainage increases significantly or becomes thick, and the area around the incision looks very red, call your physician (a small amount of redness just around the incision is normal).    -You may take Tylenol and ibuprofen around the clock for pain.  You can alternate between the Tylenol and ibuprofen so that you are taking something for pain every 3 hours. Be sure to not exceed the maximum dosage for these medications as listed on the label. You may take the narcotic pain medication you have been prescribed for breakthrough pain when the Tylenol and ibuprofen are not taking care of your pain. Only fill the narcotic medication if you feel your pain is not well suited to the tylenol and ibuprofen    -After you arrive at home, call your Urologist’s office to arrange a follow-up appointment.

## 2022-08-20 NOTE — PROGRESS NOTE ADULT - ATTENDING COMMENTS
S/P radical prostatectomy   doing well  d/c home today     I have seen the patient with the urology team.   I have reviewed and verified the PA/ resident note, physical exam findings, PMHx, PSUx, social history and hospital course during rounds with house staff.   I have discussed pertinent laboraotry results and imaging studies reviewed.    I agree with assessment and plan as per resident's note.     Plan of care discussed with the team during rounds.

## 2022-08-20 NOTE — DISCHARGE NOTE PROVIDER - NSDCCPCAREPLAN_GEN_ALL_CORE_FT
PRINCIPAL DISCHARGE DIAGNOSIS  Diagnosis: Malignant neoplasm of prostate  Assessment and Plan of Treatment:

## 2022-08-20 NOTE — PROGRESS NOTE ADULT - ASSESSMENT
61 y/o Male with h/o asthma, GERD, HLD, elevated PSA s/p biopsy demonstrating prostate cancer, s/p single port robotic assisted laparoscopic radical prostatectomy, pelvic lymph node dissection on 8/19/22.

## 2022-08-20 NOTE — DISCHARGE NOTE PROVIDER - CARE PROVIDER_API CALL
Valdo Velazquez)  Urology  [address] [place] [address], [place] [address]  Phone: [phone]  Fax: [phone]  Follow Up Time: 1 week

## 2022-08-20 NOTE — DISCHARGE NOTE NURSING/CASE MANAGEMENT/SOCIAL WORK - NSDPDISTO_GEN_ALL_CORE
Pt A&Ox4. VS stable. Pt had no c/o pain. Skin care provided. OOB standby assist. Abdomen scope sites x3 WDL. Dubois catheter in place, light red urine noted. Tolerating PO intake. Passing gas. Pt turned & positioned q2hrs. Pt medicated as ordered, tolerated well. Hourly rounding performed. No distress noted. Safety maintained. Pt educated on plan of care. Pt deemed stable for d/c. PIV x2 removed. Pt with no c/o pain or s&s of distress at time of d/c. Pt given d/c education, pt states understanding. Pt taught dubois care and leg bag care, provided supplies. D/c to home./Home

## 2022-08-20 NOTE — DISCHARGE NOTE PROVIDER - NSDCMRMEDTOKEN_GEN_ALL_CORE_FT
acetaminophen 325 mg oral tablet: 3 tab(s) orally every 6 hours  atorvastatin 40 mg oral tablet: 1 tab(s) orally once a day  oxyCODONE 5 mg oral tablet: 1 tab(s) orally every 6 hours, As needed, Severe Pain (7 - 10) MDD:4 tabs  Pepcid 20 mg oral tablet: 2 tab(s) orally once a day (at bedtime)  PriLOSEC 20 mg oral delayed release capsule: 1 cap(s) orally once a day  Symbicort 160 mcg-4.5 mcg/inh inhalation aerosol: 2 puff(s) inhaled 2 times a day

## 2022-08-20 NOTE — DISCHARGE NOTE NURSING/CASE MANAGEMENT/SOCIAL WORK - NSDCPNINST_GEN_ALL_CORE
Call MD for any c/o no urine in drainage bag, bloody urine, pain not relieved after taking pain medications, fever >100.5 and a return appointment.  Make sure the urine bag is hanging below the level of your waist.  Wash your hands before and after you touch your catheter, tubing or drainage bag.  Use soap and water to clean penis and dubois bag.  Abdominal scope sites with steri strip clean, dry and intact. Maintain Dubois catheter to gravity drainage leg bag as instructed, remember hand washing and infection prevention measures in order to prevent catheter-related-urinary-tract-infections ("CAUTI"). Follow up with your primary MD. Drink plent of fluids.

## 2022-08-20 NOTE — PROGRESS NOTE ADULT - SUBJECTIVE AND OBJECTIVE BOX
Zulema Denilson  [facility]  Pager #[phone]  Available on Microsoft Teams    Patient is a 62y old  Male who presents with a chief complaint of s/p surgery (19 Aug 2022 14:10)      SUBJECTIVE / OVERNIGHT EVENTS: Patient seen and examined at bedside. Patient feeling well, no complaints. Tolerating liquid diet.    ADDITIONAL REVIEW OF SYSTEMS:    MEDICATIONS  (STANDING):  acetaminophen     Tablet .. 975 milliGRAM(s) Oral every 6 hours  atorvastatin 40 milliGRAM(s) Oral at bedtime  budesonide 160 MICROgram(s)/formoterol 4.5 MICROgram(s) Inhaler 2 Puff(s) Inhalation two times a day  famotidine    Tablet 20 milliGRAM(s) Oral daily  heparin   Injectable 5000 Unit(s) SubCutaneous every 8 hours  ketorolac   Injectable 15 milliGRAM(s) IV Push every 6 hours  lactated ringers. 1000 milliLiter(s) (125 mL/Hr) IV Continuous <Continuous>  pantoprazole    Tablet 40 milliGRAM(s) Oral before breakfast  senna 2 Tablet(s) Oral at bedtime  tetracaine 0.5% Solution 1 Drop(s) Left EYE once    MEDICATIONS  (PRN):  oxyCODONE    IR 5 milliGRAM(s) Oral every 6 hours PRN Severe Pain (7 - 10)  polyethylene glycol 3350 17 Gram(s) Oral daily PRN Constipation      CAPILLARY BLOOD GLUCOSE        I&O's Summary    19 Aug 2022 07:01  -  20 Aug 2022 07:00  --------------------------------------------------------  IN: 1115 mL / OUT: 2285 mL / NET: -1170 mL    20 Aug 2022 07:01  -  20 Aug 2022 17:32  --------------------------------------------------------  IN: 240 mL / OUT: 620 mL / NET: -380 mL        PHYSICAL EXAM:    Vital Signs Last 24 Hrs  T(C): 36.4 (20 Aug 2022 13:52), Max: 36.9 (20 Aug 2022 01:32)  T(F): 97.5 (20 Aug 2022 13:52), Max: 98.4 (20 Aug 2022 01:32)  HR: 85 (20 Aug 2022 13:52) (69 - 98)  BP: 105/66 (20 Aug 2022 13:52) (94/65 - 118/64)  BP(mean): 87 (19 Aug 2022 17:45) (87 - 87)  RR: 17 (20 Aug 2022 13:52) (16 - 18)  SpO2: 100% (20 Aug 2022 13:52) (96% - 100%)    Parameters below as of 20 Aug 2022 13:52  Patient On (Oxygen Delivery Method): room air        CONSTITUTIONAL: NAD, well-developed, well-groomed  EYES: Conjunctiva and sclera clear  ENMT: Moist oral mucosa  RESPIRATORY: Normal respiratory effort; lungs are clear to auscultation bilaterally  CARDIOVASCULAR: Regular rate and rhythm, normal S1 and S2, no murmur/rub/gallop  ABDOMEN: Soft, nontender to palpation, normoactive bowel sounds, Cruz draining red urine  PSYCH: A+O to person, place, and time; affect appropriate  NEUROLOGY: CN 2-12 are intact and symmetric; no gross sensory deficits   SKIN: No rashes; no palpable lesions    LABS:    RADIOLOGY & ADDITIONAL TESTS: No new imaging  Results Reviewed: Yes  Imaging Personally Reviewed:  Electrocardiogram Personally Reviewed:    COORDINATION OF CARE:  Care Discussed with Consultants/Other Providers [Y/N]: Yes- urology  Prior or Outpatient Records Reviewed [Y/N]:

## 2022-08-20 NOTE — PROGRESS NOTE ADULT - SUBJECTIVE AND OBJECTIVE BOX
DAILY PROGRESS NOTE:         24 hr events:  shahriar    Objective:    Vital Signs Last 24 Hrs  T(C): 36.9 (20 Aug 2022 01:32), Max: 36.9 (19 Aug 2022 17:00)  T(F): 98.4 (20 Aug 2022 01:32), Max: 98.4 (19 Aug 2022 17:00)  HR: 76 (20 Aug 2022 01:32) (76 - 98)  BP: 94/65 (20 Aug 2022 01:32) (94/65 - 133/73)  BP(mean): 87 (19 Aug 2022 17:45) (74 - 95)  RR: 18 (20 Aug 2022 01:32) (13 - 19)  SpO2: 98% (20 Aug 2022 01:32) (95% - 100%)    Parameters below as of 20 Aug 2022 01:32  Patient On (Oxygen Delivery Method): room air        I&O's Detail    19 Aug 2022 07:01  -  20 Aug 2022 06:57  --------------------------------------------------------  IN:    Lactated Ringers: 875 mL    Oral Fluid: 240 mL  Total IN: 1115 mL    OUT:    Indwelling Catheter - Urethral (mL): 1885 mL  Total OUT: 1885 mL    Total NET: -770 mL          Physical Exam:    ****    Laboratory Results:                               DAILY PROGRESS NOTE:         24 hr events:  naeon  passing gas, no bm     Objective:    Vital Signs Last 24 Hrs  T(C): 36.9 (20 Aug 2022 01:32), Max: 36.9 (19 Aug 2022 17:00)  T(F): 98.4 (20 Aug 2022 01:32), Max: 98.4 (19 Aug 2022 17:00)  HR: 76 (20 Aug 2022 01:32) (76 - 98)  BP: 94/65 (20 Aug 2022 01:32) (94/65 - 133/73)  BP(mean): 87 (19 Aug 2022 17:45) (74 - 95)  RR: 18 (20 Aug 2022 01:32) (13 - 19)  SpO2: 98% (20 Aug 2022 01:32) (95% - 100%)    Parameters below as of 20 Aug 2022 01:32  Patient On (Oxygen Delivery Method): room air        I&O's Detail    19 Aug 2022 07:01  -  20 Aug 2022 06:57  --------------------------------------------------------  IN:    Lactated Ringers: 875 mL    Oral Fluid: 240 mL  Total IN: 1115 mL    OUT:    Indwelling Catheter - Urethral (mL): 1885 mL  Total OUT: 1885 mL    Total NET: -770 mL          Physical Exam:  Gen: NAD  Pulm: Normal work of breathing on RA  CV: Regular rate  : dubois with clear red urine, watermelon colred    Laboratory Results:

## 2022-08-20 NOTE — DISCHARGE NOTE NURSING/CASE MANAGEMENT/SOCIAL WORK - PATIENT PORTAL LINK FT
You can access the FollowMyHealth Patient Portal offered by [facility] by registering at the following website: [email or web address]. By joining [facility]’s FollowMyHealth portal, you will also be able to view your health information using other applications (apps) compatible with our system.

## 2022-08-20 NOTE — PROGRESS NOTE ADULT - ASSESSMENT
63yo M s/p SP RALP. Recovering well     8/20: Tolerating CLD. No n/v.     --CLD. Advance w/ GI fxn  --OOB/IS  --SQH for DVT ppx  --Pain control   63yo M s/p SP GIBRANP. Recovering well     8/20: Tolerating CLD. No n/v. will advance, potential d/c today.     --CLD. Advance w/ GI fxn  --OOB/IS  --SQH for DVT ppx  --Pain control

## 2022-08-24 PROBLEM — E78.5 HYPERLIPIDEMIA, UNSPECIFIED: Chronic | Status: ACTIVE | Noted: 2022-08-03

## 2022-08-24 PROBLEM — J45.909 UNSPECIFIED ASTHMA, UNCOMPLICATED: Chronic | Status: ACTIVE | Noted: 2022-08-03

## 2022-08-24 PROBLEM — K21.9 GASTRO-ESOPHAGEAL REFLUX DISEASE WITHOUT ESOPHAGITIS: Chronic | Status: ACTIVE | Noted: 2022-08-03

## 2022-08-25 ENCOUNTER — APPOINTMENT (OUTPATIENT)
Dept: UROLOGY | Facility: CLINIC | Age: 62
End: 2022-08-25

## 2022-08-25 VITALS
TEMPERATURE: 98 F | RESPIRATION RATE: 17 BRPM | WEIGHT: 172 LBS | HEIGHT: 69 IN | DIASTOLIC BLOOD PRESSURE: 76 MMHG | BODY MASS INDEX: 25.48 KG/M2 | SYSTOLIC BLOOD PRESSURE: 117 MMHG | HEART RATE: 97 BPM

## 2022-08-25 PROCEDURE — 99024 POSTOP FOLLOW-UP VISIT: CPT

## 2022-08-31 NOTE — LETTER CLOSING
[Consult Closing:] : Thank you for allowing me to participate in the care of this patient.  If you have any questions, please do not hesitate to contact me. [Sincerely yours,] : Sincerely yours, [FreeTextEntry3] : Valdo Velazquez MD\par System Chief of Urology, [facility] [place]\par  of Urology\par [facility]\par [facility] \par [address] [place] [address], [address] \[place] [address]

## 2022-08-31 NOTE — LETTER CLOSING
[Consult Closing:] : Thank you for allowing me to participate in the care of this patient.  If you have any questions, please do not hesitate to contact me. [Sincerely yours,] : Sincerely yours, [FreeTextEntry3] : Valdo Velazquez MD\par System Chief of Urology, [facility] [place]\par  of Urology\par [facility]\par [facility] \par [address] [place] [address], [address] \[place] [address]

## 2022-08-31 NOTE — PHYSICAL EXAM
[FreeTextEntry1] : Gen:  No apparent distress\par Abdomen: soft, non tender, non distended.  \par Incision: clean and intact.  No surrounding erythema.\par :  normal bladder. Cruz draining clear yellow urine.\par Ext: no edema.  \par Neuro: Normal gait, normal LE strength.\par \par

## 2022-08-31 NOTE — DISEASE MANAGEMENT
[1] : T1 [c] : c [0] : M0 [0-10] : 0 -10 ng/mL [Biopsy with Fusion] : Patient had a biopsy with fusion on [7(3+4)] : Fusion Biopsy McK[place] Score: 7(3+4) [Biopsy results sent to PCP/Referring Physician] : Biopsy results sent to PCP/Referring Physician [IIB] : IIB [Patient had a radical prostatectomy] : Patient had a radical prostatectomy  [] : Patient had no Bone Scan performed [BiopsyDate] : 5/9/2022 [TotalCores] : 13 [TotalPositiveCores] : 4 [MaxCoreInvolvement] : 40% [FreeTextEntry7] : MRI prostate ([facility]): 33 mL volume, PIRAD 4, Right PZpl. No OMAIRA, SV. [RadicalProstatectomyDate] : 8/19/2022

## 2022-08-31 NOTE — HISTORY OF PRESENT ILLNESS
[FreeTextEntry1] : Here for postop visit.\par 1 week status post radical prostatectomy.  No postoperative complications.  Only catheter draining clear yellow urine.  No significant abdominal pain or flank pain.  Incisions are healing well.  Tolerating regular diet and ambulating without difficulty.

## 2022-08-31 NOTE — DISEASE MANAGEMENT
[1] : T1 [c] : c [0] : M0 [0-10] : 0 -10 ng/mL [Biopsy with Fusion] : Patient had a biopsy with fusion on [7(3+4)] : Fusion Biopsy [place] Score: 7(3+4) [Biopsy results sent to PCP/Referring Physician] : Biopsy results sent to PCP/Referring Physician [IIB] : IIB [Patient had a radical prostatectomy] : Patient had a radical prostatectomy  [] : Patient had no Bone Scan performed [BiopsyDate] : 5/9/2022 [TotalCores] : 13 [TotalPositiveCores] : 4 [MaxCoreInvolvement] : 40% [FreeTextEntry7] : MRI prostate ([facility]): 33 mL volume, PIRAD 4, Right PZpl. No OMAIRA, SV. [RadicalProstatectomyDate] : 8/19/2022

## 2022-09-02 LAB — SURGICAL PATHOLOGY STUDY: SIGNIFICANT CHANGE UP

## 2022-09-21 NOTE — BRIEF OPERATIVE NOTE - OPERATION/FINDINGS
Procedure: SP RALP, PLND  Preop dx: Prostate cancer  Postop dx: Prostate cancer
Renita Landaverde DO

## 2022-10-14 NOTE — PROGRESS NOTE ADULT - PROBLEM SELECTOR PLAN 1
S/p single port robotic assisted laparoscopic radical prostatectomy, pelvic lymph node dissection on 8/19/22  postop management per , pain control, IVF, incentive spirometry, DVT ppx
[employer]

## 2022-10-20 ENCOUNTER — APPOINTMENT (OUTPATIENT)
Dept: UROLOGY | Facility: CLINIC | Age: 62
End: 2022-10-20

## 2022-10-20 VITALS
SYSTOLIC BLOOD PRESSURE: 124 MMHG | HEIGHT: 69 IN | RESPIRATION RATE: 16 BRPM | WEIGHT: 170 LBS | DIASTOLIC BLOOD PRESSURE: 72 MMHG | BODY MASS INDEX: 25.18 KG/M2 | HEART RATE: 90 BPM

## 2022-10-20 LAB — PSA SERPL-MCNC: <0.01 NG/ML

## 2022-10-20 PROCEDURE — 99024 POSTOP FOLLOW-UP VISIT: CPT

## 2022-10-25 NOTE — HISTORY OF PRESENT ILLNESS
[FreeTextEntry1] : Here for 6 week follow up.\par Urinary function: mild stress incontinence, wears one pad per day. Feels that he could go w/o using pads on some days.  Normal flow, complete emptying.  No hematuria, dysuria.\par ED:  feels that he is having partial erections but not firm enough for intercourse.\par Incisions well healed.

## 2022-10-25 NOTE — DISEASE MANAGEMENT
[1] : T1 [c] : c [0-10] : 0 -10 ng/mL [Biopsy with Fusion] : Patient had a biopsy with fusion on [Biopsy results sent to PCP/Referring Physician] : Biopsy results sent to PCP/Referring Physician [] : Patient had a Prostate MRI [TotalCores] : 13 [BiopsyDate] : 5/9/2022 [TotalPositiveCores] : 4 [MaxCoreInvolvement] : 40% [FreeTextEntry7] : MRI prostate ([facility]): 33 mL volume, PIRAD 4, Right PZpl. No OMAIRA, SV. [IIB] : IIB [Radical Prostatectomy] : Radical Prostatectomy [Patient had a radical prostatectomy] : Patient had a radical prostatectomy  [7(3+4)] : David Score 7(3+4) [Negative] : Negative margins [2] : T2 [0] : N0 [X] : MX [RadicalProstatectomyDate] : 8/19/2022

## 2023-01-19 LAB — PSA SERPL-MCNC: <0.01 NG/ML

## 2023-01-23 ENCOUNTER — APPOINTMENT (OUTPATIENT)
Dept: UROLOGY | Facility: CLINIC | Age: 63
End: 2023-01-23
Payer: MEDICAID

## 2023-01-23 PROCEDURE — 99214 OFFICE O/P EST MOD 30 MIN: CPT

## 2023-02-12 NOTE — DISEASE MANAGEMENT
[1] : T1 [c] : c [0-10] : 0 -10 ng/mL [Biopsy with Fusion] : Patient had a biopsy with fusion on [Biopsy results sent to PCP/Referring Physician] : Biopsy results sent to PCP/Referring Physician [IIB] : IIB [Radical Prostatectomy] : Radical Prostatectomy [Patient had a radical prostatectomy] : Patient had a radical prostatectomy  [7(3+4)] : David Score 7(3+4) [Negative] : Negative margins [2] : T2 [0] : N0 [X] : MX [] : Patient had no Bone Scan performed [BiopsyDate] : 5/9/2022 [TotalCores] : 13 [TotalPositiveCores] : 4 [MaxCoreInvolvement] : 40% [FreeTextEntry7] : MRI prostate ([facility]): 33 mL volume, PIRAD 4, Right PZpl. No OMAIRA, SV. [RadicalProstatectomyDate] : 8/19/2022

## 2023-02-12 NOTE — DISEASE MANAGEMENT
[1] : T1 [c] : c [0-10] : 0 -10 ng/mL [Biopsy with Fusion] : Patient had a biopsy with fusion on [Biopsy results sent to PCP/Referring Physician] : Biopsy results sent to PCP/Referring Physician [IIB] : IIB [Radical Prostatectomy] : Radical Prostatectomy [Patient had a radical prostatectomy] : Patient had a radical prostatectomy  [7(3+4)] : David Score 7(3+4) [Negative] : Negative margins [2] : T2 [0] : N0 [X] : MX [] : Patient had no Bone Scan performed [BiopsyDate] : 5/9/2022 [TotalCores] : 13 [TotalPositiveCores] : 4 [MaxCoreInvolvement] : 40% [FreeTextEntry7] : MRI prostate ([facility]): 33 mL volume, PIRAD 4, Right PZpl. No OMAIRA, SV. [RadicalProstatectomyDate] : 8/19/2022

## 2023-02-12 NOTE — HISTORY OF PRESENT ILLNESS
[FreeTextEntry1] : Here for three month follow up.\par Urinary function stable. No stress incontinence. \par Urine flow is excellent, complete emptying.\par Continues to have post prostatectomy erectile dysfunction.\par Feels that partial erection <50% of full rigidity.\par No other new complaints.

## 2023-02-16 RX ORDER — SILDENAFIL 20 MG/1
20 TABLET ORAL
Qty: 30 | Refills: 11 | Status: ACTIVE | COMMUNITY
Start: 2023-01-23 | End: 1900-01-01

## 2023-05-26 ENCOUNTER — APPOINTMENT (OUTPATIENT)
Dept: PULMONOLOGY | Facility: CLINIC | Age: 63
End: 2023-05-26
Payer: MEDICAID

## 2023-05-26 ENCOUNTER — TRANSCRIPTION ENCOUNTER (OUTPATIENT)
Age: 63
End: 2023-05-26

## 2023-05-26 VITALS
BODY MASS INDEX: 25.92 KG/M2 | TEMPERATURE: 97.3 F | HEIGHT: 69 IN | WEIGHT: 175 LBS | OXYGEN SATURATION: 98 % | HEART RATE: 70 BPM | SYSTOLIC BLOOD PRESSURE: 112 MMHG | DIASTOLIC BLOOD PRESSURE: 78 MMHG

## 2023-05-26 DIAGNOSIS — K21.9 GASTRO-ESOPHAGEAL REFLUX DISEASE W/OUT ESOPHAGITIS: ICD-10-CM

## 2023-05-26 DIAGNOSIS — R06.2 WHEEZING: ICD-10-CM

## 2023-05-26 DIAGNOSIS — R05.9 COUGH, UNSPECIFIED: ICD-10-CM

## 2023-05-26 PROCEDURE — 99203 OFFICE O/P NEW LOW 30 MIN: CPT

## 2023-05-26 RX ORDER — PREDNISONE 20 MG/1
20 TABLET ORAL
Qty: 10 | Refills: 0 | Status: ACTIVE | COMMUNITY
Start: 2023-05-26 | End: 1900-01-01

## 2023-05-26 RX ORDER — MONTELUKAST 10 MG/1
10 TABLET, FILM COATED ORAL
Qty: 90 | Refills: 3 | Status: ACTIVE | COMMUNITY
Start: 2023-05-26 | End: 1900-01-01

## 2023-05-26 NOTE — DISCUSSION/SUMMARY
[FreeTextEntry1] : 62-year-old male with history of "asthma" with recent increase in symptoms.  Prednisone 40 mg daily for the next 5 days was prescribed .Montelukast daily was also prescribed.  He is to continue use of Symbicort twice daily with as needed albuterol metered-dose inhaler.  GERD treatment was discussed at length.  He is to return in the future for pulmonary function test.  Follow-up with his PMD was recommended.

## 2023-05-26 NOTE — HISTORY OF PRESENT ILLNESS
[Former] : former [< 20 pack-years] : < 20 pack-years [TextBox_4] : 62-year-old male with history of "asthma" since childhood presents for evaluation.  Patient states that his symptoms had improved up until 5 years ago.  He complains of occasional cough and wheezing with shortness of breath which is worse over the winter months.  His symptoms have worsened over the last 6 months.  He denies fever chills chest pain hemoptysis nor night sweats.  He has never been admitted to the hospital for his asthma nor has she required oral steroids.  He uses Symbicort twice daily with as needed albuterol metered-dose inhaler.  He has GERD on PPI and H2 blockers.  He has remote smoking history having quit 20 years ago. [YearQuit] : 2003 [TextBox_29] : Denies snoring, daytime somnolence, apneic episodes, AM headaches

## 2023-05-26 NOTE — REVIEW OF SYSTEMS
[Cough] : cough [Sputum] : sputum [Dyspnea] : dyspnea [Wheezing] : wheezing [GERD] : gerd [Negative] : Endocrine

## 2023-07-07 ENCOUNTER — APPOINTMENT (OUTPATIENT)
Dept: PULMONOLOGY | Facility: CLINIC | Age: 63
End: 2023-07-07
Payer: MEDICAID

## 2023-07-07 VITALS
HEART RATE: 80 BPM | SYSTOLIC BLOOD PRESSURE: 110 MMHG | TEMPERATURE: 97.5 F | BODY MASS INDEX: 26.29 KG/M2 | OXYGEN SATURATION: 98 % | DIASTOLIC BLOOD PRESSURE: 74 MMHG | WEIGHT: 178 LBS

## 2023-07-07 DIAGNOSIS — R06.02 SHORTNESS OF BREATH: ICD-10-CM

## 2023-07-07 DIAGNOSIS — J45.909 UNSPECIFIED ASTHMA, UNCOMPLICATED: ICD-10-CM

## 2023-07-07 PROCEDURE — 94060 EVALUATION OF WHEEZING: CPT

## 2023-07-07 PROCEDURE — 94727 GAS DIL/WSHOT DETER LNG VOL: CPT

## 2023-07-07 PROCEDURE — 99214 OFFICE O/P EST MOD 30 MIN: CPT | Mod: 25

## 2023-07-07 PROCEDURE — 94729 DIFFUSING CAPACITY: CPT

## 2023-07-07 NOTE — HISTORY OF PRESENT ILLNESS
[Former] : former [< 20 pack-years] : < 20 pack-years [TextBox_4] : 63-year-old male with history of asthma presents for follow-up.  The patient feels "much better" on twice daily Symbicort, daily montelukast with occasional albuterol use.  He denies cough chest pain or hemoptysis. [YearQuit] : 2003 [TextBox_29] : Denies snoring, daytime somnolence, apneic episodes, AM headaches

## 2023-07-07 NOTE — DISCUSSION/SUMMARY
[FreeTextEntry1] : 63-year-old male with history of asthma at baseline.I reviewed the PFT results with the patient.  The mild decrease in lung volumes is likely effort dependent.  He is to continue Symbicort, montelukast and occasional albuterol use.  Treatment adjust will depend on symptomatic needs.  The patient states that his symptoms are worse in the winter.  He is to follow-up with his PMD as before.

## 2023-07-07 NOTE — REVIEW OF SYSTEMS
[Wheezing] : wheezing [GERD] : gerd [Negative] : Endocrine [Cough] : no cough [Sputum] : no sputum [Dyspnea] : no dyspnea

## 2023-07-22 ENCOUNTER — NON-APPOINTMENT (OUTPATIENT)
Age: 63
End: 2023-07-22

## 2023-07-22 DIAGNOSIS — C61 MALIGNANT NEOPLASM OF PROSTATE: ICD-10-CM

## 2023-07-22 DIAGNOSIS — N52.31 ERECTILE DYSFUNCTION FOLLOWING RADICAL PROSTATECTOMY: ICD-10-CM

## 2023-07-22 LAB — PSA SERPL-MCNC: <0.01 NG/ML

## 2023-07-24 ENCOUNTER — APPOINTMENT (OUTPATIENT)
Dept: UROLOGY | Facility: CLINIC | Age: 63
End: 2023-07-24

## 2023-08-15 ENCOUNTER — APPOINTMENT (OUTPATIENT)
Dept: OPHTHALMOLOGY | Facility: CLINIC | Age: 63
End: 2023-08-15
Payer: MEDICAID

## 2023-08-15 ENCOUNTER — NON-APPOINTMENT (OUTPATIENT)
Age: 63
End: 2023-08-15

## 2023-08-15 PROCEDURE — 92004 COMPRE OPH EXAM NEW PT 1/>: CPT

## 2023-11-03 ENCOUNTER — APPOINTMENT (OUTPATIENT)
Dept: PULMONOLOGY | Facility: CLINIC | Age: 63
End: 2023-11-03

## 2024-01-16 ENCOUNTER — APPOINTMENT (OUTPATIENT)
Dept: UROLOGY | Facility: CLINIC | Age: 64
End: 2024-01-16

## 2024-07-11 ENCOUNTER — NON-APPOINTMENT (OUTPATIENT)
Age: 64
End: 2024-07-11

## 2024-07-16 ENCOUNTER — APPOINTMENT (OUTPATIENT)
Dept: UROLOGY | Facility: CLINIC | Age: 64
End: 2024-07-16
Payer: MEDICAID

## 2024-07-16 VITALS
BODY MASS INDEX: 26.36 KG/M2 | DIASTOLIC BLOOD PRESSURE: 72 MMHG | HEART RATE: 73 BPM | SYSTOLIC BLOOD PRESSURE: 114 MMHG | HEIGHT: 69 IN | RESPIRATION RATE: 17 BRPM | TEMPERATURE: 97.7 F | WEIGHT: 178 LBS

## 2024-07-16 DIAGNOSIS — N52.31 ERECTILE DYSFUNCTION FOLLOWING RADICAL PROSTATECTOMY: ICD-10-CM

## 2024-07-16 DIAGNOSIS — Z85.46 PERSONAL HISTORY OF MALIGNANT NEOPLASM OF PROSTATE: ICD-10-CM

## 2024-07-16 PROCEDURE — 99212 OFFICE O/P EST SF 10 MIN: CPT

## 2024-07-16 PROCEDURE — G2211 COMPLEX E/M VISIT ADD ON: CPT | Mod: NC,1L

## 2024-11-13 NOTE — PATIENT PROFILE ADULT - DO YOU FEEL THREATENED BY OTHERS?
Is the patient currently in the state of [place]? YES    Current patient location: [address] [place] [address]    Visit mode:VIDEO    If the visit is dropped, the patient can be reconnected by: VIDEO VISIT:  Send e-mail to at [email or web address]    Will anyone else be joining the visit? No  (If patient encounters technical issues they should call [phone])    Are changes needed to the allergy or medication list? N/A    Are refills needed on medications prescribed by this physician? No    Rooming Documentation:  Completed check-in with mom. Mom stated she'll be joining with Zoom link.    Reason for visit: JAY Perry, VVF         no

## 2025-04-17 NOTE — ASU PREOP CHECKLIST - NS PREOP CHK INSULIN PUMP THERAPY
Pt is requesting a refill of dilaudid 4mg QID prn, #48.  Last prescription was written 4/7/25.  Preferred pharmacy [facility].      
n/a

## 2025-07-17 ENCOUNTER — APPOINTMENT (OUTPATIENT)
Dept: UROLOGY | Facility: CLINIC | Age: 65
End: 2025-07-17
Payer: MEDICAID

## 2025-07-17 ENCOUNTER — NON-APPOINTMENT (OUTPATIENT)
Age: 65
End: 2025-07-17

## 2025-07-17 VITALS
DIASTOLIC BLOOD PRESSURE: 74 MMHG | HEART RATE: 66 BPM | SYSTOLIC BLOOD PRESSURE: 114 MMHG | WEIGHT: 180 LBS | OXYGEN SATURATION: 95 % | BODY MASS INDEX: 26.66 KG/M2 | HEIGHT: 69 IN | RESPIRATION RATE: 17 BRPM

## 2025-07-17 PROCEDURE — G2211 COMPLEX E/M VISIT ADD ON: CPT | Mod: NC

## 2025-07-17 PROCEDURE — 99212 OFFICE O/P EST SF 10 MIN: CPT

## (undated) DEVICE — DRSG MASTISOL

## (undated) DEVICE — ELCTR GROUNDING PAD ADULT COVIDIEN

## (undated) DEVICE — POSITIONER PINK PAD PIGAZZI SYSTEM

## (undated) DEVICE — POSITIONER FOAM HEAD CRADLE (PINK)

## (undated) DEVICE — SUT VLOC 90 3-0 6" CV-23 UNDYED

## (undated) DEVICE — DRSG TEGADERM 2.5X3"

## (undated) DEVICE — SUT MONOCRYL 4-0 27" PS-2 UNDYED

## (undated) DEVICE — SP DRAPE ARM

## (undated) DEVICE — POSITIONER PURPLE ARM ONE STEP (LARGE)

## (undated) DEVICE — SP CLIP APPLIER MEDIUM-LARGE 6MM

## (undated) DEVICE — SUT VICRYL 1 36" CT-1 UNDYED

## (undated) DEVICE — LUBRICATING JELLY ONESHOT 1.25OZ

## (undated) DEVICE — SOL INJ NS 0.9% 1000ML

## (undated) DEVICE — GOWN XXXL

## (undated) DEVICE — PACK ROBOTIC LIJ

## (undated) DEVICE — SYR CATH TIP 2 OZ

## (undated) DEVICE — PACK PERI GYN

## (undated) DEVICE — VENODYNE/SCD SLEEVE CALF MEDIUM

## (undated) DEVICE — SUT VICRYL 2-0 27" SH UNDYED

## (undated) DEVICE — Device

## (undated) DEVICE — PREP BETADINE SPONGE STICKS

## (undated) DEVICE — WARMING BLANKET UPPER ADULT

## (undated) DEVICE — SP MARYLAND BIPOLAR FORCEP 6MM

## (undated) DEVICE — SUT VICRYL 2-0 36" CT-1 UNDYED

## (undated) DEVICE — GLV 7.5 PROTEXIS (WHITE)

## (undated) DEVICE — TUBING STRYKEFLOW II SUCTION / IRRIGATOR

## (undated) DEVICE — GOWN XL

## (undated) DEVICE — SUT POLYSORB 0 60" TIES UNDYED

## (undated) DEVICE — FOLEY CATH 2-WAY 18FR 5CC SILICONE

## (undated) DEVICE — SP COVER CAMERA SHEATH

## (undated) DEVICE — GLV 8 PROTEXIS (WHITE)

## (undated) DEVICE — SP KIT LAP ENTRYGUIDE STND L100X25MM

## (undated) DEVICE — SP NEEDLE DRIVER 6MM

## (undated) DEVICE — FOLEY HOLDER STATLOCK 2 WAY ADULT

## (undated) DEVICE — BAG URINE W METER 2L

## (undated) DEVICE — DRAIN RESERVOIR FOR JACKSON PRATT 100CC CARDINAL

## (undated) DEVICE — SOL IRR BAG H2O 3000ML

## (undated) DEVICE — SUT VLOC 90 3-0 6" CV-23 VIOLET

## (undated) DEVICE — GLV 7.5 PROTEXIS (CREAM) MICRO

## (undated) DEVICE — SUT VLOC 180 2-0 6" GS-22 GREEN

## (undated) DEVICE — TROCAR SURGIQUEST AIRSEAL 8MMX100MM

## (undated) DEVICE — SP MONOPOLAR SCISSOR CURVED 6MM

## (undated) DEVICE — PRESSURE INFUSOR BAG 1000ML

## (undated) DEVICE — FOLEY CATH 2-WAY 20FR 5CC SILASTIC

## (undated) DEVICE — TUBING AIRSEAL TRI-LUMEN FILTERED

## (undated) DEVICE — SP SHEATH